# Patient Record
Sex: MALE | Race: WHITE | Employment: OTHER | URBAN - METROPOLITAN AREA
[De-identification: names, ages, dates, MRNs, and addresses within clinical notes are randomized per-mention and may not be internally consistent; named-entity substitution may affect disease eponyms.]

---

## 2018-08-17 ENCOUNTER — RECORDS - HEALTHEAST (OUTPATIENT)
Dept: LAB | Facility: CLINIC | Age: 48
End: 2018-08-17

## 2018-08-17 LAB — URATE SERPL-MCNC: 7.9 MG/DL (ref 3–8)

## 2022-01-06 ENCOUNTER — APPOINTMENT (OUTPATIENT)
Dept: CT IMAGING | Facility: CLINIC | Age: 52
End: 2022-01-06
Attending: PHYSICIAN ASSISTANT
Payer: COMMERCIAL

## 2022-01-06 ENCOUNTER — TELEPHONE (OUTPATIENT)
Dept: UROLOGY | Facility: CLINIC | Age: 52
End: 2022-01-06

## 2022-01-06 ENCOUNTER — HOSPITAL ENCOUNTER (EMERGENCY)
Facility: CLINIC | Age: 52
Discharge: HOME | End: 2022-01-06
Attending: PHYSICIAN ASSISTANT | Admitting: PHYSICIAN ASSISTANT
Payer: COMMERCIAL

## 2022-01-06 VITALS
BODY MASS INDEX: 28 KG/M2 | RESPIRATION RATE: 18 BRPM | SYSTOLIC BLOOD PRESSURE: 156 MMHG | WEIGHT: 200 LBS | TEMPERATURE: 97.3 F | HEIGHT: 71 IN | HEART RATE: 61 BPM | OXYGEN SATURATION: 99 % | DIASTOLIC BLOOD PRESSURE: 116 MMHG

## 2022-01-06 DIAGNOSIS — N20.0 KIDNEY STONE: ICD-10-CM

## 2022-01-06 LAB
ALBUMIN SERPL-MCNC: 4.1 G/DL (ref 3.4–5)
ALBUMIN UR-MCNC: 30 MG/DL
ALP SERPL-CCNC: 70 U/L (ref 40–150)
ALT SERPL W P-5'-P-CCNC: 53 U/L (ref 0–70)
ANION GAP SERPL CALCULATED.3IONS-SCNC: 6 MMOL/L (ref 3–14)
APPEARANCE UR: CLEAR
AST SERPL W P-5'-P-CCNC: 24 U/L (ref 0–45)
BASOPHILS # BLD AUTO: 0 10E3/UL (ref 0–0.2)
BASOPHILS NFR BLD AUTO: 0 %
BILIRUB SERPL-MCNC: 0.8 MG/DL (ref 0.2–1.3)
BILIRUB UR QL STRIP: NEGATIVE
BUN SERPL-MCNC: 15 MG/DL (ref 7–30)
CALCIUM SERPL-MCNC: 8.6 MG/DL (ref 8.5–10.1)
CHLORIDE BLD-SCNC: 112 MMOL/L (ref 94–109)
CO2 SERPL-SCNC: 24 MMOL/L (ref 20–32)
COLOR UR AUTO: YELLOW
CREAT SERPL-MCNC: 1.14 MG/DL (ref 0.66–1.25)
EOSINOPHIL # BLD AUTO: 0.1 10E3/UL (ref 0–0.7)
EOSINOPHIL NFR BLD AUTO: 1 %
ERYTHROCYTE [DISTWIDTH] IN BLOOD BY AUTOMATED COUNT: 12.6 % (ref 10–15)
GFR SERPL CREATININE-BSD FRML MDRD: 79 ML/MIN/1.73M2
GLUCOSE BLD-MCNC: 167 MG/DL (ref 70–99)
GLUCOSE UR STRIP-MCNC: NEGATIVE MG/DL
HCT VFR BLD AUTO: 49.3 % (ref 40–53)
HGB BLD-MCNC: 16.5 G/DL (ref 13.3–17.7)
HGB UR QL STRIP: ABNORMAL
HOLD SPECIMEN: NORMAL
IMM GRANULOCYTES # BLD: 0 10E3/UL
IMM GRANULOCYTES NFR BLD: 0 %
KETONES UR STRIP-MCNC: NEGATIVE MG/DL
LEUKOCYTE ESTERASE UR QL STRIP: NEGATIVE
LIPASE SERPL-CCNC: 49 U/L (ref 73–393)
LYMPHOCYTES # BLD AUTO: 1.5 10E3/UL (ref 0.8–5.3)
LYMPHOCYTES NFR BLD AUTO: 15 %
MCH RBC QN AUTO: 30 PG (ref 26.5–33)
MCHC RBC AUTO-ENTMCNC: 33.5 G/DL (ref 31.5–36.5)
MCV RBC AUTO: 90 FL (ref 78–100)
MONOCYTES # BLD AUTO: 0.6 10E3/UL (ref 0–1.3)
MONOCYTES NFR BLD AUTO: 6 %
MUCOUS THREADS #/AREA URNS LPF: PRESENT /LPF
NEUTROPHILS # BLD AUTO: 7.6 10E3/UL (ref 1.6–8.3)
NEUTROPHILS NFR BLD AUTO: 78 %
NITRATE UR QL: NEGATIVE
NRBC # BLD AUTO: 0 10E3/UL
NRBC BLD AUTO-RTO: 0 /100
PH UR STRIP: 5 [PH] (ref 5–7)
PLATELET # BLD AUTO: 228 10E3/UL (ref 150–450)
POTASSIUM BLD-SCNC: 4.1 MMOL/L (ref 3.4–5.3)
PROT SERPL-MCNC: 7.7 G/DL (ref 6.8–8.8)
RBC # BLD AUTO: 5.5 10E6/UL (ref 4.4–5.9)
RBC URINE: 2 /HPF
SODIUM SERPL-SCNC: 142 MMOL/L (ref 133–144)
SP GR UR STRIP: 1.03 (ref 1–1.03)
UROBILINOGEN UR STRIP-MCNC: NORMAL MG/DL
WBC # BLD AUTO: 9.9 10E3/UL (ref 4–11)
WBC URINE: 3 /HPF

## 2022-01-06 PROCEDURE — 80053 COMPREHEN METABOLIC PANEL: CPT | Performed by: PHYSICIAN ASSISTANT

## 2022-01-06 PROCEDURE — 96375 TX/PRO/DX INJ NEW DRUG ADDON: CPT

## 2022-01-06 PROCEDURE — 74176 CT ABD & PELVIS W/O CONTRAST: CPT

## 2022-01-06 PROCEDURE — 99285 EMERGENCY DEPT VISIT HI MDM: CPT | Mod: 25

## 2022-01-06 PROCEDURE — 96374 THER/PROPH/DIAG INJ IV PUSH: CPT

## 2022-01-06 PROCEDURE — 85025 COMPLETE CBC W/AUTO DIFF WBC: CPT | Performed by: PHYSICIAN ASSISTANT

## 2022-01-06 PROCEDURE — 250N000011 HC RX IP 250 OP 636: Performed by: PHYSICIAN ASSISTANT

## 2022-01-06 PROCEDURE — 81001 URINALYSIS AUTO W/SCOPE: CPT | Performed by: PHYSICIAN ASSISTANT

## 2022-01-06 PROCEDURE — 82040 ASSAY OF SERUM ALBUMIN: CPT | Performed by: PHYSICIAN ASSISTANT

## 2022-01-06 PROCEDURE — 83690 ASSAY OF LIPASE: CPT | Performed by: PHYSICIAN ASSISTANT

## 2022-01-06 PROCEDURE — 36415 COLL VENOUS BLD VENIPUNCTURE: CPT | Performed by: PHYSICIAN ASSISTANT

## 2022-01-06 RX ORDER — ONDANSETRON 2 MG/ML
4 INJECTION INTRAMUSCULAR; INTRAVENOUS EVERY 30 MIN PRN
Status: DISCONTINUED | OUTPATIENT
Start: 2022-01-06 | End: 2022-01-06 | Stop reason: HOSPADM

## 2022-01-06 RX ORDER — OXYCODONE HYDROCHLORIDE 5 MG/1
5 TABLET ORAL EVERY 4 HOURS PRN
Qty: 12 TABLET | Refills: 0 | Status: SHIPPED | OUTPATIENT
Start: 2022-01-06 | End: 2022-01-10

## 2022-01-06 RX ORDER — ACETAMINOPHEN 500 MG
1000 TABLET ORAL EVERY 6 HOURS
Qty: 56 TABLET | Refills: 0 | Status: SHIPPED | OUTPATIENT
Start: 2022-01-06 | End: 2022-01-13

## 2022-01-06 RX ORDER — IBUPROFEN 200 MG
400 TABLET ORAL EVERY 6 HOURS
Qty: 56 TABLET | Refills: 0 | Status: SHIPPED | OUTPATIENT
Start: 2022-01-06 | End: 2022-01-13

## 2022-01-06 RX ORDER — DIMENHYDRINATE 50 MG
50 TABLET ORAL EVERY 6 HOURS PRN
Qty: 28 TABLET | Refills: 0 | Status: SHIPPED | OUTPATIENT
Start: 2022-01-06 | End: 2022-01-13

## 2022-01-06 RX ORDER — TAMSULOSIN HYDROCHLORIDE 0.4 MG/1
0.4 CAPSULE ORAL DAILY
Qty: 15 CAPSULE | Refills: 0 | Status: SHIPPED | OUTPATIENT
Start: 2022-01-06

## 2022-01-06 RX ORDER — HYDROMORPHONE HYDROCHLORIDE 1 MG/ML
0.5 INJECTION, SOLUTION INTRAMUSCULAR; INTRAVENOUS; SUBCUTANEOUS
Status: DISCONTINUED | OUTPATIENT
Start: 2022-01-06 | End: 2022-01-06 | Stop reason: HOSPADM

## 2022-01-06 RX ADMIN — HYDROMORPHONE HYDROCHLORIDE 0.5 MG: 1 INJECTION, SOLUTION INTRAMUSCULAR; INTRAVENOUS; SUBCUTANEOUS at 11:24

## 2022-01-06 RX ADMIN — ONDANSETRON 4 MG: 2 INJECTION INTRAMUSCULAR; INTRAVENOUS at 11:27

## 2022-01-06 ASSESSMENT — ENCOUNTER SYMPTOMS
DYSURIA: 0
HEMATURIA: 0
DIARRHEA: 0
DIAPHORESIS: 1
SHORTNESS OF BREATH: 0
ABDOMINAL PAIN: 1
FEVER: 0
CONSTIPATION: 0
VOMITING: 1
CHILLS: 1

## 2022-01-06 ASSESSMENT — MIFFLIN-ST. JEOR: SCORE: 1794.32

## 2022-01-06 NOTE — ED TRIAGE NOTES
Pt reports right sided sudden abdominal pain that began at 0915 today. Sent by EMS from . Rated pain 10/10 prior to receiving 50mcg fentanyl from medics. Now rates 6/10.

## 2022-01-06 NOTE — TELEPHONE ENCOUNTER
[facility] Call Center    Phone Message    May a detailed message be left on voicemail: yes     Reason for Call: Appointment Intake    Referring Provider Name: Twin Barry PA-C  Diagnosis and/or Symptoms: Kidney Stone    Patient being referred for URGENT Appt (3-5 days) for kidney stone by referring provider. Writer unable to schedule patient within timeframe requested by referring provider, so sending encounter message for clinic review and follow-up with patient for scheduling.     Action Taken: [facility]      Travel Screening: Not Applicable

## 2022-01-06 NOTE — ED PROVIDER NOTES
"  History   Chief Complaint:  Abdominal Pain     The history is provided by the EMS personnel and the patient.      Brandt Majano is an otherwise healthy 51 year old male who presents via EMS from clinic with abdominal pain. The patient reports sudden onset of right-sided abdominal pain while at work this morning at roughly 0915. It has been mostly constant since then, and he believes it may have radiated downwards slightly. He was seen at [facility] this morning but was recommended to present to the ED. EMS gave 50 mcg fentanyl and 250 mL IV fluids, which decreased his pain from a 10/10 to a 6/10 in severity. He was hypertensive en route at 170/100. Here, he endorses vomiting, chills, and diaphoresis without measured fevers. He denies diarrhea, dysuria, hematuria, chest pain, or shortness of breath. He has not had a bowel movement today but does not feel constipated. No falls or trauma to the area. No history of kidney stones.     Review of Systems   Constitutional: Positive for chills and diaphoresis. Negative for fever.   Respiratory: Negative for shortness of breath.    Cardiovascular: Negative for chest pain.   Gastrointestinal: Positive for abdominal pain and vomiting. Negative for constipation and diarrhea.   Genitourinary: Negative for dysuria and hematuria.   All other systems reviewed and are negative.      Allergies:  No Known Allergies    Medications:  The patient is not currently taking any daily medications.     Past Medical History:     The patient denies past medical history.     Past Surgical History:    The patient denies past surgical history.      Family History:    The patient denies past family history.     Social History:  Presents to ED alone.     Physical Exam     Patient Vitals for the past 24 hrs:   BP Temp Temp src Pulse Resp SpO2 Height Weight   01/06/22 1130 -- -- -- -- -- 99 % -- --   01/06/22 1110 (!) 156/116 97.3  F (36.3  C) Temporal 61 18 100 % 1.803 m (5' 11\") 90.7 kg " (200 lb)       Physical Exam  Constitutional: Pleasant. Cooperative.   Eyes: Pupils equally round and reactive  HENT: Head is normal in appearance. Oropharynx is normal with moist mucus membranes.  Cardiovascular: Regular rate and rhythm and without murmurs.  Respiratory: Normal respiratory effort, lungs are clear bilaterally.  GI: TTP to RLQ, otherwise non-tender, non-distended. No guarding, rebound, or rigidity.  Musculoskeletal: No asymmetry of the lower extremities, no tenderness to palpation. No CVA TTP.  Skin: Normal, without rash.  Neurologic: Cranial nerves grossly intact, normal cognition, no focal deficits. Alert and oriented x 3.   Psychiatric: Normal affect.  Nursing notes and vital signs reviewed.    Emergency Department Course     Imaging:  CT Abdomen Pelvis without Contrast (stone protocol)   Preliminary Result   IMPRESSION:    1.  Obstructing 0.3 cm stone at the right ureterovesical junction   causes mild right hydronephrosis.   2.  Diffuse fatty infiltration of the liver.   3.  Coronary artery calcification, prominent for age.        Report per radiology    Laboratory:  Labs Ordered and Resulted from Time of ED Arrival to Time of ED Departure   COMPREHENSIVE METABOLIC PANEL - Abnormal       Result Value    Sodium 142      Potassium 4.1      Chloride 112 (*)     Carbon Dioxide (CO2) 24      Anion Gap 6      Urea Nitrogen 15      Creatinine 1.14      Calcium 8.6      Glucose 167 (*)     Alkaline Phosphatase 70      AST 24      ALT 53      Protein Total 7.7      Albumin 4.1      Bilirubin Total 0.8      GFR Estimate 79     LIPASE - Abnormal    Lipase 49 (*)    ROUTINE UA WITH MICROSCOPIC REFLEX TO CULTURE - Abnormal    Color Urine Yellow      Appearance Urine Clear      Glucose Urine Negative      Bilirubin Urine Negative      Ketones Urine Negative      Specific Gravity Urine 1.032      Blood Urine Small (*)     pH Urine 5.0      Protein Albumin Urine 30  (*)     Urobilinogen Urine Normal      Nitrite  Urine Negative      Leukocyte Esterase Urine Negative      Mucus Urine Present (*)     RBC Urine 2      WBC Urine 3     CBC WITH PLATELETS AND DIFFERENTIAL    WBC Count 9.9      RBC Count 5.50      Hemoglobin 16.5      Hematocrit 49.3      MCV 90      MCH 30.0      MCHC 33.5      RDW 12.6      Platelet Count 228      % Neutrophils 78      % Lymphocytes 15      % Monocytes 6      % Eosinophils 1      % Basophils 0      % Immature Granulocytes 0      NRBCs per 100 WBC 0      Absolute Neutrophils 7.6      Absolute Lymphocytes 1.5      Absolute Monocytes 0.6      Absolute Eosinophils 0.1      Absolute Basophils 0.0      Absolute Immature Granulocytes 0.0      Absolute NRBCs 0.0        Emergency Department Course:  Reviewed:  I reviewed nursing notes, vitals, past medical history, Care Everywhere and [facility].    Assessments:  1106 I obtained history and examined the patient as noted above.   1225 I rechecked the patient and explained findings.     Interventions:  1124 Dilaudid 0.5 mg, IV  1127 Zofran 4 mg, IV    Disposition:  The patient was discharged to home.     Impression & Plan     Medical Decision Making:  Brandt Majano is a 51 year old male who presents to the ED for evaluation of RLQ abdominal pain. CT confirms a ureteral stone. Pain is controlled with interventions in the Emergency Department. There is no fever or evidence of a urinary tract infection. The patient will be discharged with opioid analgesics and Ibuprofen for pain. Flomax will be prescribed daily to attempt to ease stone passage.   Dramamine was prescribed for nausea.  I considered other etiologies for these symptoms including AAA and pyelonephritis but these are unlikely given the otherwise normal CT scan and urinalysis.  The patient is instructed to return if increasing pain not controlled with pain meds, vomiting, and fever. Strain urine to look for stone, if detected, submit to primary doctor for lab analysis. Instructions were given to follow  up with urology within one week, sooner if pain continues, as retrieval of the stone may be required for refractory symptoms. All questions answered. Patient discharged to home in stable condition.    Diagnosis:    ICD-10-CM    1. Kidney stone  N20.0 Adult Urology Referral       Discharge Medications:  Discharge Medication List as of 1/6/2022 12:49 PM      START taking these medications    Details   acetaminophen (TYLENOL) 500 MG tablet Take 2 tablets (1,000 mg) by mouth every 6 hours for 7 days, Disp-56 tablet, R-0, E-Prescribe      dimenhyDRINATE (DRAMAMINE) 50 MG tablet Take 1 tablet (50 mg) by mouth every 6 hours as needed for other (kidney stone pain management), Disp-28 tablet, R-0, E-Prescribe      ibuprofen (ADVIL/MOTRIN) 200 MG tablet Take 2 tablets (400 mg) by mouth every 6 hours for 7 days, Disp-56 tablet, R-0, E-Prescribe      oxyCODONE (ROXICODONE) 5 MG tablet Take 1 tablet (5 mg) by mouth every 4 hours as needed for severe pain If pain is not improved with acetaminophen and ibuprofen., Disp-12 tablet, R-0, E-Prescribe      tamsulosin (FLOMAX) 0.4 MG capsule Take 1 capsule (0.4 mg) by mouth daily, Disp-15 capsule, R-0, E-Prescribe             Scribe Disclosure:  I, Amy Bates, am serving as a scribe at 11:07 AM on 1/6/2022 to document services personally performed by Twin Barry PA-C based on my observations and the provider's statements to me.     This record was created at least in part using electronic voice recognition software, so please excuse any typographical errors.          Twin Barry PA-C  01/06/22 [id number]

## 2022-01-06 NOTE — DISCHARGE INSTRUCTIONS
Use Tylenol and ibuprofen for pain on a scheduled basis.  Dramamine has been show to help to decrease spasming of ureter. This may be sedating.  Use oxycodone for breakthrough pain. This is sedating. Do not drive after taking.

## 2022-01-06 NOTE — ED NOTES
Bed: ED23  Expected date:   Expected time:   Means of arrival:   Comments:  E 2  51 M from /abd pain  1101

## 2022-01-08 ENCOUNTER — LAB (OUTPATIENT)
Dept: LAB | Facility: CLINIC | Age: 52
End: 2022-01-08
Payer: COMMERCIAL

## 2022-01-08 DIAGNOSIS — N20.1 URETERAL STONE: ICD-10-CM

## 2022-01-08 PROCEDURE — 82365 CALCULUS SPECTROSCOPY: CPT

## 2022-01-26 ENCOUNTER — VIRTUAL VISIT (OUTPATIENT)
Dept: UROLOGY | Facility: CLINIC | Age: 52
End: 2022-01-26
Payer: COMMERCIAL

## 2022-01-26 VITALS — HEIGHT: 71 IN | WEIGHT: 184 LBS | BODY MASS INDEX: 25.76 KG/M2

## 2022-01-26 DIAGNOSIS — N20.0 RENAL STONE: Primary | ICD-10-CM

## 2022-01-26 DIAGNOSIS — N20.1 URETERAL STONE: ICD-10-CM

## 2022-01-26 PROCEDURE — 99204 OFFICE O/P NEW MOD 45 MIN: CPT | Mod: GT | Performed by: PHYSICIAN ASSISTANT

## 2022-01-26 ASSESSMENT — MIFFLIN-ST. JEOR: SCORE: 1721.75

## 2022-01-26 ASSESSMENT — PAIN SCALES - GENERAL: PAINLEVEL: NO PAIN (0)

## 2022-01-26 NOTE — LETTER
Date:January 27, 2022      Patient was self referred, no letter generated. Do not send.        [facility] Health Information

## 2022-01-26 NOTE — PROGRESS NOTES
*SEND LINK TO CELL PHONE*    Brandt is a 51 year old who is being evaluated via a billable video visit.      How would you like to obtain your AVS? Mail  If the video visit is dropped, the invitation should be resent by: Text to cell phone: [phone]  Will anyone else be joining your video visit? No      Video Start Time: 10:46 AM  Video-Visit Details    Type of service:  Video Visit    Video End Time:10:59 AM    Originating Location (pt. Location): Home    Distant Location (provider location):  [facility] UROLOGY CLIN[facility] [place]     Platform used for Video Visit: [employer]     CC: Ureteral stone.    HPI: It is a pleasure to see Mr. Brandt Majano, a pleasant 51 year old male seen today in the urology clinic in ER follow up via Twin Barry PA-C for evaluation of the above. This was first detected on CT in the ED on 1/6/22 after the patient presented complaining of acute renal colic symptoms. CT noted an bstructing 0.3 cm stone at the right ureterovesical junction causing mild right hydronephrosis. UA in the ED was negative for infection. BMP revealed Cr and Ca to be normal. With pain under good control, the patient was discharged with a prescription for tamsulosin and instructions to follow up with urology.    Currently, the patient reports no pain. Did not capture stone Denies gross hematuria, dysuria, fevers, chills, N/V. No prior history of kidney stones.    Hx of gout.    RECENT IMAGING:  CT ABDOMEN AND PELVIS WITHOUT CONTRAST 1/6/2022 11:47 AM     CLINICAL HISTORY: Right lower quadrant pain.     TECHNIQUE: CT scan of the abdomen and pelvis was performed without IV  contrast. Multiplanar reformats were obtained. Dose reduction  techniques were used.  CONTRAST: None.  COMPARISON: None.     FINDINGS:   LOWER CHEST: Visualized lung bases are clear. Coronary artery  calcification. Small hiatal hernia.     HEPATOBILIARY: Diffuse fatty infiltration of the liver. No hepatic  masses are seen. The gallbladder is  unremarkable.     PANCREAS: Normal.     SPLEEN: Normal.     ADRENAL GLANDS: Normal.     KIDNEYS/BLADDER: There is an obstructing 0.3 cm stone at the right  ureterovesical junction, causing mild right hydronephrosis and  perinephric stranding. No other urinary calculi are identified. No  hydronephrosis on the left.     BOWEL: No bowel obstruction. A few scattered sigmoid diverticula. No  convincing evidence for colitis or diverticulitis. The appendix is not  seen, and is surgically absent by history.     PELVIC ORGANS: Unremarkable.     LYMPH NODES: No enlarged lymph nodes are identified in the abdomen or  pelvis.     VASCULATURE: Unremarkable.     ADDITIONAL FINDINGS: None.     MUSCULOSKELETAL: Unremarkable.                                                                      IMPRESSION:   1.  Obstructing 0.3 cm stone at the right ureterovesical junction  causes mild right hydronephrosis.  2.  Diffuse fatty infiltration of the liver.  3.  Coronary artery calcification, prominent for age.     CARINA VERDUGO MD     Past Medical History:   Diagnosis Date     Gout        History reviewed. No pertinent surgical history.    Current Outpatient Medications   Medication Sig Dispense Refill     tamsulosin (FLOMAX) 0.4 MG capsule Take 1 capsule (0.4 mg) by mouth daily (Patient not taking: Reported on 1/26/2022) 15 capsule 0       No Known Allergies    FAMILY HISTORY: There is no family h/o  malignancy.  There is no family h/o urolithiasis.     Social History     Socioeconomic History     Marital status:      Spouse name: Not on file     Number of children: Not on file     Years of education: Not on file     Highest education level: Not on file   Occupational History     Not on file   Tobacco Use     Smoking status: Never Smoker     Smokeless tobacco: Never Used   Substance and Sexual Activity     Alcohol use: Never     Drug use: Never     Sexual activity: Not on file   Other Topics Concern     Parent/sibling w/  "CABG, MI or angioplasty before 65F 55M? Not Asked   Social History Narrative     Not on file     Social Determinants of Health     Financial Resource Strain: Not on file   Food Insecurity: Not on file   Transportation Needs: Not on file   Physical Activity: Not on file   Stress: Not on file   Social Connections: Not on file   Intimate Partner Violence: Not on file   Housing Stability: Not on file       ROS: A comprehensive 14 point ROS was obtained and otherwise negative except for that outlined above in the HPI.    GENERAL PHYSICAL EXAM:   Ht 1.803 m (5' 11\")   Wt 83.5 kg (184 lb)   BMI 25.66 kg/m    PSYCH: NAD  EYES: EOMI      ASSESSMENT AND PLAN: 51 year old male with a  Tiny right-sided calculus with associated  Hydronephrosis, captured; hx of gout  - Stone analysis  - Warning signs discussed including fevers, chills, N/V, gross hematuria, severe pain that is refractory to medications which should prompt more urgent evaluation. Patient understands to proceed to the ER should these warning signs occur outside of clinic hours.    During counseling for this visit, we covered the natural history of kidney stones, the risk of progression to symptomatic pain/infection, and the possibility of renal failure/kidney damage.     Standard recommendations for kidney stone prevention were provided.  Neph referral for medical management and prevention.       Nathalia Agosto PA-C  [facility]      27 minutes spent on the date of the encounter doing chart review, review of outside records, review of test results, interpretation of tests, patient visit and documentation and review of CT images.    "

## 2022-01-26 NOTE — LETTER
1/26/2022       RE: Brandt Majano  [address] [address]     Dear Colleague,    Thank you for referring your patient, Brandt Majano, to the [facility] UROLOGY CLINIC [place] at [facility]. Please see a copy of my visit note below.    *SEND LINK TO CELL PHONE*    Brandt is a 51 year old who is being evaluated via a billable video visit.      How would you like to obtain your AVS? Mail  If the video visit is dropped, the invitation should be resent by: Text to cell phone: [phone]  Will anyone else be joining your video visit? No      Video Start Time: 10:46 AM  Video-Visit Details    Type of service:  Video Visit    Video End Time:10:59 AM    Originating Location (pt. Location): Home    Distant Location (provider location):  [facility] UROLOGY CL[facility] [place]     Platform used for Video Visit: [employer]     CC: Ureteral stone.    HPI: It is a pleasure to see Mr. Brandt Majano, a pleasant 51 year old male seen today in the urology clinic in ER follow up via Twin Barry PA-C for evaluation of the above. This was first detected on CT in the ED on 1/6/22 after the patient presented complaining of acute renal colic symptoms. CT noted an bstructing 0.3 cm stone at the right ureterovesical junction causing mild right hydronephrosis. UA in the ED was negative for infection. BMP revealed Cr and Ca to be normal. With pain under good control, the patient was discharged with a prescription for tamsulosin and instructions to follow up with urology.    Currently, the patient reports no pain. Did not capture stone Denies gross hematuria, dysuria, fevers, chills, N/V. No prior history of kidney stones.    Hx of gout.    RECENT IMAGING:  CT ABDOMEN AND PELVIS WITHOUT CONTRAST 1/6/2022 11:47 AM     CLINICAL HISTORY: Right lower quadrant pain.     TECHNIQUE: CT scan of the abdomen and pelvis was performed without IV  contrast. Multiplanar reformats were  obtained. Dose reduction  techniques were used.  CONTRAST: None.  COMPARISON: None.     FINDINGS:   LOWER CHEST: Visualized lung bases are clear. Coronary artery  calcification. Small hiatal hernia.     HEPATOBILIARY: Diffuse fatty infiltration of the liver. No hepatic  masses are seen. The gallbladder is unremarkable.     PANCREAS: Normal.     SPLEEN: Normal.     ADRENAL GLANDS: Normal.     KIDNEYS/BLADDER: There is an obstructing 0.3 cm stone at the right  ureterovesical junction, causing mild right hydronephrosis and  perinephric stranding. No other urinary calculi are identified. No  hydronephrosis on the left.     BOWEL: No bowel obstruction. A few scattered sigmoid diverticula. No  convincing evidence for colitis or diverticulitis. The appendix is not  seen, and is surgically absent by history.     PELVIC ORGANS: Unremarkable.     LYMPH NODES: No enlarged lymph nodes are identified in the abdomen or  pelvis.     VASCULATURE: Unremarkable.     ADDITIONAL FINDINGS: None.     MUSCULOSKELETAL: Unremarkable.                                                                      IMPRESSION:   1.  Obstructing 0.3 cm stone at the right ureterovesical junction  causes mild right hydronephrosis.  2.  Diffuse fatty infiltration of the liver.  3.  Coronary artery calcification, prominent for age.     CARINA VERDUGO MD     Past Medical History:   Diagnosis Date     Gout        History reviewed. No pertinent surgical history.    Current Outpatient Medications   Medication Sig Dispense Refill     tamsulosin (FLOMAX) 0.4 MG capsule Take 1 capsule (0.4 mg) by mouth daily (Patient not taking: Reported on 1/26/2022) 15 capsule 0       No Known Allergies    FAMILY HISTORY: There is no family h/o  malignancy.  There is no family h/o urolithiasis.     Social History     Socioeconomic History     Marital status:      Spouse name: Not on file     Number of children: Not on file     Years of education: Not on file     Highest  "education level: Not on file   Occupational History     Not on file   Tobacco Use     Smoking status: Never Smoker     Smokeless tobacco: Never Used   Substance and Sexual Activity     Alcohol use: Never     Drug use: Never     Sexual activity: Not on file   Other Topics Concern     Parent/sibling w/ CABG, MI or angioplasty before 65F 55M? Not Asked   Social History Narrative     Not on file     Social Determinants of Health     Financial Resource Strain: Not on file   Food Insecurity: Not on file   Transportation Needs: Not on file   Physical Activity: Not on file   Stress: Not on file   Social Connections: Not on file   Intimate Partner Violence: Not on file   Housing Stability: Not on file       ROS: A comprehensive 14 point ROS was obtained and otherwise negative except for that outlined above in the HPI.    GENERAL PHYSICAL EXAM:   Ht 1.803 m (5' 11\")   Wt 83.5 kg (184 lb)   BMI 25.66 kg/m    PSYCH: NAD  EYES: EOMI      ASSESSMENT AND PLAN: 51 year old male with a  Tiny right-sided calculus with associated  Hydronephrosis, captured; hx of gout  - Stone analysis  - Warning signs discussed including fevers, chills, N/V, gross hematuria, severe pain that is refractory to medications which should prompt more urgent evaluation. Patient understands to proceed to the ER should these warning signs occur outside of clinic hours.    During counseling for this visit, we covered the natural history of kidney stones, the risk of progression to symptomatic pain/infection, and the possibility of renal failure/kidney damage.     Standard recommendations for kidney stone prevention were provided.  Neph referral for medical management and prevention.       Nathalia Agosto PA-C  [facility]      27 minutes spent on the date of the encounter doing chart review, review of outside records, review of test results, interpretation of tests, patient visit and documentation and review of CT images.        Again, thank you for " allowing me to participate in the care of your patient.      Sincerely,    Nathalia Agosto PA-C, [place]

## 2022-01-26 NOTE — PATIENT INSTRUCTIONS
Please make an appt with Nephrology for medical management and prevention of kidney stones: [facility] will call to coordinate this. If you do not hear from a representative within two business days, please call [phone].        Standard recommendations for kidney stone prevention:  -These include maintaining fluid intake of 3 liters per day or more with a goal of making 2 or more liters of urine per day.  If alcoholic or caffeinated beverages are consumed, you need to drink water along with these beverages to maintain hydration.    -A few ounces of lemon juice concentrate a day diluted in water can help prevent stones (citrate is a stone inhibitor).    -Sodium influences calcium excretion in the urine. Limit intake of red meat, salt, and salty processed foods.    -Uric acid-high levels in the blood can lead to kidney stones and gout, especially if the urine pH is low.  Reduce protein intake, especially red meats.  -Weight loss, if overweight, can reduce the recurrence of kidney stones.  -Diabetes-if diabetic, you are at a greater risk of having kidney stones.  -Maintain calcium intake in diet through continued consumption of dairy products etc.    -Limit foods that are high in oxalate such as spinach, sweet potatoes, dark chocolate, soy products, and some nuts such as peanuts.   -Medications that can increase risk of kidney stones: Ephedrine, Guaifenesin, Triamterene, Lasix, Diamox, Topamax, Zonegran, laxatives.     -Additional/different recommendations pending any stone analysis.

## 2022-01-27 ENCOUNTER — TELEPHONE (OUTPATIENT)
Dept: UROLOGY | Facility: CLINIC | Age: 52
End: 2022-01-27
Payer: COMMERCIAL

## 2022-01-27 NOTE — TELEPHONE ENCOUNTER
----- Message from Nenita Merida sent at 1/26/2022  3:30 PM CST -----  Stone drop off, at [facility]  Neph referral    TADEO

## 2022-03-20 LAB
APPEARANCE STONE: NORMAL
COMPN STONE: NORMAL
SPECIMEN WT: 8 MG